# Patient Record
Sex: MALE | Race: ASIAN | NOT HISPANIC OR LATINO | Employment: FULL TIME | URBAN - METROPOLITAN AREA
[De-identification: names, ages, dates, MRNs, and addresses within clinical notes are randomized per-mention and may not be internally consistent; named-entity substitution may affect disease eponyms.]

---

## 2022-08-21 ENCOUNTER — APPOINTMENT (OUTPATIENT)
Dept: CT IMAGING | Facility: HOSPITAL | Age: 32
End: 2022-08-21
Attending: EMERGENCY MEDICINE
Payer: COMMERCIAL

## 2022-08-21 ENCOUNTER — HOSPITAL ENCOUNTER (EMERGENCY)
Facility: HOSPITAL | Age: 32
Discharge: HOME | End: 2022-08-21
Attending: EMERGENCY MEDICINE | Admitting: EMERGENCY MEDICINE
Payer: COMMERCIAL

## 2022-08-21 VITALS
DIASTOLIC BLOOD PRESSURE: 82 MMHG | TEMPERATURE: 99.3 F | SYSTOLIC BLOOD PRESSURE: 140 MMHG | HEIGHT: 65 IN | BODY MASS INDEX: 29.99 KG/M2 | OXYGEN SATURATION: 99 % | WEIGHT: 180 LBS | RESPIRATION RATE: 16 BRPM | HEART RATE: 73 BPM

## 2022-08-21 DIAGNOSIS — S00.81XA ABRASION OF FACE, INITIAL ENCOUNTER: ICD-10-CM

## 2022-08-21 DIAGNOSIS — S50.811A ABRASION OF RIGHT FOREARM, INITIAL ENCOUNTER: ICD-10-CM

## 2022-08-21 DIAGNOSIS — S06.0X9A CONCUSSION WITH LOSS OF CONSCIOUSNESS, INITIAL ENCOUNTER: ICD-10-CM

## 2022-08-21 LAB
ANION GAP SERPL CALCULATED.3IONS-SCNC: 10 MMOL/L (ref 5–18)
APTT PPP: 25 SECONDS (ref 22–38)
BASOPHILS # BLD AUTO: 0.1 10E3/UL (ref 0–0.2)
BASOPHILS NFR BLD AUTO: 1 %
BUN SERPL-MCNC: 18 MG/DL (ref 8–22)
CALCIUM SERPL-MCNC: 9.1 MG/DL (ref 8.5–10.5)
CHLORIDE BLD-SCNC: 105 MMOL/L (ref 98–107)
CO2 SERPL-SCNC: 26 MMOL/L (ref 22–31)
CREAT SERPL-MCNC: 0.95 MG/DL (ref 0.7–1.3)
EOSINOPHIL # BLD AUTO: 0.1 10E3/UL (ref 0–0.7)
EOSINOPHIL NFR BLD AUTO: 0 %
ERYTHROCYTE [DISTWIDTH] IN BLOOD BY AUTOMATED COUNT: 12.4 % (ref 10–15)
ETHANOL SERPL-MCNC: <10 MG/DL
GFR SERPL CREATININE-BSD FRML MDRD: >90 ML/MIN/1.73M2
GLUCOSE BLD-MCNC: 116 MG/DL (ref 70–125)
HCT VFR BLD AUTO: 45.3 % (ref 40–53)
HGB BLD-MCNC: 15.3 G/DL (ref 13.3–17.7)
HOLD SPECIMEN: NORMAL
IMM GRANULOCYTES # BLD: 0.1 10E3/UL
IMM GRANULOCYTES NFR BLD: 1 %
INR PPP: 1.01 (ref 0.85–1.15)
LYMPHOCYTES # BLD AUTO: 1 10E3/UL (ref 0.8–5.3)
LYMPHOCYTES NFR BLD AUTO: 7 %
MCH RBC QN AUTO: 29.1 PG (ref 26.5–33)
MCHC RBC AUTO-ENTMCNC: 33.8 G/DL (ref 31.5–36.5)
MCV RBC AUTO: 86 FL (ref 78–100)
MONOCYTES # BLD AUTO: 0.7 10E3/UL (ref 0–1.3)
MONOCYTES NFR BLD AUTO: 5 %
NEUTROPHILS # BLD AUTO: 12.1 10E3/UL (ref 1.6–8.3)
NEUTROPHILS NFR BLD AUTO: 86 %
NRBC # BLD AUTO: 0 10E3/UL
NRBC BLD AUTO-RTO: 0 /100
PLATELET # BLD AUTO: 280 10E3/UL (ref 150–450)
POTASSIUM BLD-SCNC: 3.7 MMOL/L (ref 3.5–5)
RBC # BLD AUTO: 5.26 10E6/UL (ref 4.4–5.9)
SODIUM SERPL-SCNC: 141 MMOL/L (ref 136–145)
WBC # BLD AUTO: 14 10E3/UL (ref 4–11)

## 2022-08-21 PROCEDURE — 36415 COLL VENOUS BLD VENIPUNCTURE: CPT | Performed by: EMERGENCY MEDICINE

## 2022-08-21 PROCEDURE — 250N000009 HC RX 250: Performed by: EMERGENCY MEDICINE

## 2022-08-21 PROCEDURE — 99285 EMERGENCY DEPT VISIT HI MDM: CPT | Mod: 25

## 2022-08-21 PROCEDURE — 85730 THROMBOPLASTIN TIME PARTIAL: CPT | Performed by: EMERGENCY MEDICINE

## 2022-08-21 PROCEDURE — 90471 IMMUNIZATION ADMIN: CPT | Performed by: EMERGENCY MEDICINE

## 2022-08-21 PROCEDURE — 250N000011 HC RX IP 250 OP 636: Performed by: EMERGENCY MEDICINE

## 2022-08-21 PROCEDURE — 85610 PROTHROMBIN TIME: CPT | Performed by: EMERGENCY MEDICINE

## 2022-08-21 PROCEDURE — 82077 ASSAY SPEC XCP UR&BREATH IA: CPT | Performed by: EMERGENCY MEDICINE

## 2022-08-21 PROCEDURE — 90715 TDAP VACCINE 7 YRS/> IM: CPT | Performed by: EMERGENCY MEDICINE

## 2022-08-21 PROCEDURE — 70450 CT HEAD/BRAIN W/O DYE: CPT

## 2022-08-21 PROCEDURE — 82310 ASSAY OF CALCIUM: CPT | Performed by: EMERGENCY MEDICINE

## 2022-08-21 PROCEDURE — 85025 COMPLETE CBC W/AUTO DIFF WBC: CPT | Performed by: EMERGENCY MEDICINE

## 2022-08-21 PROCEDURE — 72125 CT NECK SPINE W/O DYE: CPT

## 2022-08-21 RX ORDER — GINSENG 100 MG
CAPSULE ORAL ONCE
Status: COMPLETED | OUTPATIENT
Start: 2022-08-21 | End: 2022-08-21

## 2022-08-21 RX ADMIN — CLOSTRIDIUM TETANI TOXOID ANTIGEN (FORMALDEHYDE INACTIVATED), CORYNEBACTERIUM DIPHTHERIAE TOXOID ANTIGEN (FORMALDEHYDE INACTIVATED), BORDETELLA PERTUSSIS TOXOID ANTIGEN (GLUTARALDEHYDE INACTIVATED), BORDETELLA PERTUSSIS FILAMENTOUS HEMAGGLUTININ ANTIGEN (FORMALDEHYDE INACTIVATED), BORDETELLA PERTUSSIS PERTACTIN ANTIGEN, AND BORDETELLA PERTUSSIS FIMBRIAE 2/3 ANTIGEN 0.5 ML: 5; 2; 2.5; 5; 3; 5 INJECTION, SUSPENSION INTRAMUSCULAR at 21:26

## 2022-08-21 RX ADMIN — BACITRACIN: 500 OINTMENT TOPICAL at 21:26

## 2022-08-21 ASSESSMENT — ENCOUNTER SYMPTOMS
SPEECH DIFFICULTY: 0
SEIZURES: 0
ABDOMINAL PAIN: 1
BRUISES/BLEEDS EASILY: 0
FLANK PAIN: 0
WOUND: 1
BACK PAIN: 0
LIGHT-HEADEDNESS: 0
WEAKNESS: 0
NUMBNESS: 0
CONFUSION: 1
PHOTOPHOBIA: 0
SHORTNESS OF BREATH: 0
FEVER: 0
HEADACHES: 0
NECK PAIN: 0
TROUBLE SWALLOWING: 0

## 2022-08-21 ASSESSMENT — ACTIVITIES OF DAILY LIVING (ADL)
ADLS_ACUITY_SCORE: 35
ADLS_ACUITY_SCORE: 35

## 2022-08-21 NOTE — Clinical Note
Mary Harry was seen and treated in our emergency department on 8/21/2022.  He may return to work on 08/23/2022.       If you have any questions or concerns, please don't hesitate to call.      Uvaldo Solorzano MD

## 2022-08-21 NOTE — ED NOTES
From triage with friend for c/o head injury that occurred today around 1500 from skateboarding incident.  Arrives with GCS of 14.  He is able to stand, move to bed without assistance.    Arrives with no active bleed seen.  Pt is talking.  Sats are 98%.  Bilateral rise and fall of chest.  Skin is pink and warm.  No PIV, however started in room.  Alert.  Oriented to self, [place], [place].  Doesn't know the month.  He knows why he's here. [place].  Pupils are 2mm, round, reactive, bilaterally.  Has a cervical collar on, denies neck pain.    There is a friend in the room.      Per friend, pt perseverates on events of incident.    Pt has localized swelling and ecchymosis to right temple.  Area is dressed with a bandaids.  Has right forearm PTA dressing, dressing removed.  Has abrasion to right anterior shoulder, shirt is ripped from incident.  Has abrasions to lower back.

## 2022-08-21 NOTE — ED PROVIDER NOTES
EMERGENCY DEPARTMENT ENCOUNTER      NAME: Mary Harry  AGE: 32 year old male  YOB: 1990  MRN: [id number]  EVALUATION DATE & TIME: No admission date for patient encounter.    PCP: No primary care provider on file.    ED PROVIDER: Everett Solorzano MD        Chief Complaint   Patient presents with     Trauma         FINAL IMPRESSION:  1. Concussion with loss of consciousness, initial encounter    2. Abrasion of face, initial encounter    3. Abrasion of right forearm, initial encounter          ED COURSE & MEDICAL DECISION MAKIN:33 PM I met with the patient, obtained history, performed an initial exam, and discussed options and plan for diagnostics and treatment here in the ED. PPE worn: n95 mask, face shield, gloves   9:12 PM I rechecked the patient. Memory is improving.  9:41 PM Plan to discharge. Patient's friends and family will be with him at home tonight.     Pertinent Labs & Imaging studies reviewed. (See chart for details)  32 year old male presents to the Emergency Department for evaluation of head injury with loss of consciousness    Has amnesia regarding the event.  Plan for CT head and neck.  Likely concussion.  CT imaging negative for fracture or intracranial hemorrhage.  No evidence of hyphema or orbit injury.  Mental status is improving.  Prior to arrival he was perseverating on questions.  Now getting better per family.  Abrasion right forearm without evidence of fracture    Tetanus updated.  Wounds cleaned and dressed.     CT imaging negative for fracture or intracranial hemorrhage.  Neuro intact.  No midline C-spine tenderness.  C-spine cleared    Patient's memory is improving.  GCS 15.    Is family and friends who can stay with him tonight and tomorrow.    Will refer to concussion clinic and primary care.           ED Course as of 22   Sun Aug 21, 2022   2154 INR: 1.01    PTT: 25    Alcohol, Blood: <10    WBC(!): 14.0  Infection unlikely based on history and exam  "      At the conclusion of the encounter I discussed the results of all of the tests and the disposition. The questions were answered. The patient or family acknowledged understanding and was agreeable with the care plan.       MEDICATIONS GIVEN IN THE EMERGENCY:  Medications   Tdap (tetanus-diphtheria-acell pertussis) (ADACEL) injection 0.5 mL (0.5 mLs Intramuscular Given 8/21/22 2126)   bacitracin ointment ( Topical Given 8/21/22 2126)       NEW PRESCRIPTIONS STARTED AT TODAY'S ER VISIT  New Prescriptions    No medications on file          =================================================================    HPI    Triage note  \"Patient presents here with a head injury incurred by falling from his skate board, striking his head first and hitting his head against an asphalt trail. His friend who was skate boarding with him states that he was up on his feet when he came up to him. He does have amnesia and does not recall the fall. He is repeatedly asking about his glasses, despite his friend reminding him about them. No neck pain or headache, He does note back pain. He has abraisions to the right side of his face and abraisons to his right forearm that was wrapped before arrival.      \"      Patient information was obtained from: Family member and Patient     Use of : N/A         Mary Harry is a 32 year old male with no pertinent history who presents to this ED by private vehicle for evaluation of head injury.     Per family member, the patient was riding his electric skateboard on the bike trail when he fell, hitting his head first onto the asphalt. The patient was able to get onto his feet, but once the family member found him, the patient had amnesia, asking questions about the incident multiple times, such as \"what happened?\" The family member adds that the patient fell asleep after going back home and was not able to remember what happened in the past 10-15 minutes. The family member is unsure if the " "patient lost consciousness. Here in the ED, the patient denies neck pain, back pain, shoulder pain, and headaches. He is not on any medications. The patient otherwise denies any other symptoms or complaints at this time.     REVIEW OF SYSTEMS   Review of Systems   Constitutional: Negative for fever.        Positive for drowsiness.   HENT: Negative for trouble swallowing.    Eyes: Negative for photophobia.   Respiratory: Negative for shortness of breath.    Cardiovascular: Negative for chest pain.   Gastrointestinal: Positive for abdominal pain.   Genitourinary: Negative for flank pain.   Musculoskeletal: Negative for back pain and neck pain.        Negative for shoulder pain.   Skin: Positive for wound.   Neurological: Negative for seizures, syncope, speech difficulty, weakness, light-headedness, numbness and headaches.        Positive for head injury and memory loss.   Hematological: Does not bruise/bleed easily.   Psychiatric/Behavioral: Positive for confusion.       PAST MEDICAL HISTORY:  History reviewed. No pertinent past medical history.    PAST SURGICAL HISTORY:  History reviewed. No pertinent surgical history.        CURRENT MEDICATIONS:    No current outpatient medications on file.      ALLERGIES:  No Known Allergies    FAMILY HISTORY:  History reviewed. No pertinent family history.    SOCIAL HISTORY:        VITALS:  /78   Pulse 86   Temp 99.3  F (37.4  C) (Oral)   Resp 16   Ht 1.651 m (5' 5\")   Wt 81.6 kg (180 lb)   SpO2 98%   BMI 29.95 kg/m      PHYSICAL EXAM      Vitals: /78   Pulse 86   Temp 99.3  F (37.4  C) (Oral)   Resp 16   Ht 1.651 m (5' 5\")   Wt 81.6 kg (180 lb)   SpO2 98%   BMI 29.95 kg/m      General Appearance: Alert, cooperative, normal speech and facial symmetry,  appears stated age    Primary survey:     Airway patent  Breath sounds: bilateral breath sounds  Cardiovascular: 2+ radial pulses and DP pulses  Disability GCS 15    Secondary survey    Head: Abrasion to " right brow.   Eyes:  PERRL,pupils midsized, conjunctiva/corneas clear, EOM's intact, no orbital injury  ENT:  No obvious facial deformity.  No tenderness to palpation.  No epistaxis.  Extraocular movements are intact.  No evidence of orbital injury.  Normal dentition.  Normal bite.   Neck:  No midline cervical spine tenderness.    Chest:  No tenderness or deformity, no crepitus  Cardio:  Regular rate and rhythm  Pulm:  Clear to auscultation bilaterally, respirations unlabored,   Back:  Symmetric, no curvature, ROM normal, no CVA tenderness, no spinal tenderness  Abdomen:  Soft, non-tender  Extremities:  No obvious deformity, all joints palpated in place with full range of motion.  No tenderness or instability.  Patient is able to bear full weight, no tenderness over joints or extremities, no cyanosis or edema, full function and range of motion, pulses equal in all extremities, normal cap refill  Skin: Abrasions to right forearm and right forehead.   Neuro:  Awake, alert, responsive to voice, follows commands, normal speech, No gross motor weakness or sensory loss, moves all extremities, baseline ambulation, GCS 15, memory is improving.  Knows what year it is.  When challenged to remember words I stated earlier in the shift he does remember earlier conversation       LAB:  All pertinent labs reviewed and interpreted.  Results for orders placed or performed during the hospital encounter of 08/21/22   Head CT w/o contrast    Impression    IMPRESSION:  HEAD CT:  1.  Mild right frontal/periorbital scalp swelling without acute intracranial abnormality.    CERVICAL SPINE CT:  1.  No CT evidence for acute fracture or post traumatic subluxation.   Cervical spine CT w/o contrast    Impression    IMPRESSION:  HEAD CT:  1.  Mild right frontal/periorbital scalp swelling without acute intracranial abnormality.    CERVICAL SPINE CT:  1.  No CT evidence for acute fracture or post traumatic subluxation.   Basic metabolic panel    Result Value Ref Range    Sodium 141 136 - 145 mmol/L    Potassium 3.7 3.5 - 5.0 mmol/L    Chloride 105 98 - 107 mmol/L    Carbon Dioxide (CO2) 26 22 - 31 mmol/L    Anion Gap 10 5 - 18 mmol/L    Urea Nitrogen 18 8 - 22 mg/dL    Creatinine 0.95 0.70 - 1.30 mg/dL    Calcium 9.1 8.5 - 10.5 mg/dL    Glucose 116 70 - 125 mg/dL    GFR Estimate >90 >60 mL/min/1.73m2   Alcohol level blood   Result Value Ref Range    Alcohol, Blood <10 None detected mg/dL   Result Value Ref Range    INR 1.01 0.85 - 1.15   Result Value Ref Range    aPTT 25 22 - 38 Seconds   CBC with platelets and differential   Result Value Ref Range    WBC Count 14.0 (H) 4.0 - 11.0 10e3/uL    RBC Count 5.26 4.40 - 5.90 10e6/uL    Hemoglobin 15.3 13.3 - 17.7 g/dL    Hematocrit 45.3 40.0 - 53.0 %    MCV 86 78 - 100 fL    MCH 29.1 26.5 - 33.0 pg    MCHC 33.8 31.5 - 36.5 g/dL    RDW 12.4 10.0 - 15.0 %    Platelet Count 280 150 - 450 10e3/uL    % Neutrophils 86 %    % Lymphocytes 7 %    % Monocytes 5 %    % Eosinophils 0 %    % Basophils 1 %    % Immature Granulocytes 1 %    NRBCs per 100 WBC 0 <1 /100    Absolute Neutrophils 12.1 (H) 1.6 - 8.3 10e3/uL    Absolute Lymphocytes 1.0 0.8 - 5.3 10e3/uL    Absolute Monocytes 0.7 0.0 - 1.3 10e3/uL    Absolute Eosinophils 0.1 0.0 - 0.7 10e3/uL    Absolute Basophils 0.1 0.0 - 0.2 10e3/uL    Absolute Immature Granulocytes 0.1 <=0.4 10e3/uL    Absolute NRBCs 0.0 10e3/uL   Extra Blue Top Tube   Result Value Ref Range    Hold Specimen JIC    Extra Red Top Tube   Result Value Ref Range    Hold Specimen JIC    Extra Green Top (Lithium Heparin) Tube   Result Value Ref Range    Hold Specimen JIC    Extra Purple Top Tube   Result Value Ref Range    Hold Specimen JIC        RADIOLOGY:  Reviewed all pertinent imaging. Please see official radiology report.  Cervical spine CT w/o contrast   Final Result   IMPRESSION:   HEAD CT:   1.  Mild right frontal/periorbital scalp swelling without acute intracranial abnormality.      CERVICAL  SPINE CT:   1.  No CT evidence for acute fracture or post traumatic subluxation.      Head CT w/o contrast   Final Result   IMPRESSION:   HEAD CT:   1.  Mild right frontal/periorbital scalp swelling without acute intracranial abnormality.      CERVICAL SPINE CT:   1.  No CT evidence for acute fracture or post traumatic subluxation.          I, Howard Harry, am serving as a scribe to document services personally performed by Uvaldo Solorzano MD based on my observation and the provider's statements to me. I, Dr. Uvaldo Solorzano, attest that Howard Harry is acting in a scribe capacity, has observed my performance of the services and has documented them in accordance with my direction.    Uvaldo Solorzano MD  Emergency Medicine  [facility] EMERGENCY DEPARTMENT  [facility] [facility] [id number] [phone]     Uvaldo Solorzano MD  08/21/22 [id number]

## 2022-08-22 NOTE — DISCHARGE INSTRUCTIONS
Follow-up with concussion clinic.  Follow-up with a primary care doctor.  Have some one stay with you tonight.  Do not go to work tomorrow.  Return to the ER for worsening symptoms.  Clean wound with soap and water.  Use Tylenol or ibuprofen for pain.

## 2024-03-10 ENCOUNTER — HEALTH MAINTENANCE LETTER (OUTPATIENT)
Age: 34
End: 2024-03-10

## 2025-03-16 ENCOUNTER — HEALTH MAINTENANCE LETTER (OUTPATIENT)
Age: 35
End: 2025-03-16